# Patient Record
Sex: FEMALE | Race: WHITE | NOT HISPANIC OR LATINO | Employment: FULL TIME | URBAN - METROPOLITAN AREA
[De-identification: names, ages, dates, MRNs, and addresses within clinical notes are randomized per-mention and may not be internally consistent; named-entity substitution may affect disease eponyms.]

---

## 2018-03-09 PROBLEM — N64.4 BREAST PAIN, RIGHT: Status: ACTIVE | Noted: 2018-03-09

## 2023-03-13 ENCOUNTER — TELEPHONE (OUTPATIENT)
Dept: OBSTETRICS AND GYNECOLOGY | Facility: CLINIC | Age: 28
End: 2023-03-13
Payer: COMMERCIAL

## 2023-03-13 NOTE — TELEPHONE ENCOUNTER
----- Message from Dee Dee Dowling sent at 3/13/2023  8:27 AM CDT -----  Contact: Self  Type:  Sooner Appointment Request    Caller is requesting a sooner appointment.  Caller declined first available appointment listed below.  Caller will not accept being placed on the waitlist and is requesting a message be sent to doctor.    Name of Caller:  Patient  When is the first available appointment?  N/A  Symptoms:  Pregnancy Confirmation, having cramping, heart palpitations and heart racing  Best Call Back Number:  [phone]  Additional Information:  Pt leaving to go out of town Friday and wants to get in this week. Please call pt back to advise. Thank You

## 2023-03-13 NOTE — TELEPHONE ENCOUNTER
Spoke with patient. LMP 2-.  Informed first visit between 8-10 weeks of pregnant. Appt scheduled for 4-.   Informed patient no amusement park rides while pregnant.   Also recommend to reach out to PCP regarding Palpitations and SOB, patient verb understanding.

## 2023-07-19 PROBLEM — O26.892 VAGINAL DISCHARGE DURING PREGNANCY IN SECOND TRIMESTER: Status: ACTIVE | Noted: 2023-07-19

## 2023-07-19 PROBLEM — Z3A.22 22 WEEKS GESTATION OF PREGNANCY: Status: ACTIVE | Noted: 2023-07-19

## 2023-07-19 PROBLEM — Z03.71 SUSPECTED RUPTURE OF MEMBRANES NOT FOUND FOR NORMAL FIRST PREGNANCY: Status: ACTIVE | Noted: 2023-07-19

## 2023-07-19 PROBLEM — R10.2 PELVIC PAIN IN PREGNANCY, ANTEPARTUM, SECOND TRIMESTER: Status: ACTIVE | Noted: 2023-07-19

## 2023-07-19 PROBLEM — N89.8 VAGINAL DISCHARGE DURING PREGNANCY IN SECOND TRIMESTER: Status: ACTIVE | Noted: 2023-07-19

## 2023-08-17 ENCOUNTER — TELEPHONE (OUTPATIENT)
Dept: OBSTETRICS AND GYNECOLOGY | Facility: CLINIC | Age: 28
End: 2023-08-17
Payer: COMMERCIAL

## 2023-08-17 NOTE — TELEPHONE ENCOUNTER
Called pt regarding appt on 8/29.   Informed pt that we would need her to fax over her medical records from her previous OB before we can see.  The doctor has to approve her transfer by her being so far along  Pt verb understanding

## 2023-08-17 NOTE — TELEPHONE ENCOUNTER
----- Message from Masha Sagastume sent at 8/17/2023  4:24 PM CDT -----  Regarding: call back  Type:  Patient Returning Call    Who Called:Pt    Who Left Message for Patient:Nurse    Does the patient know what this is regarding?:n/a    Would the patient rather a call back or a response via [facility]? Callback    Best Call Back Number:[phone]    Additional Information: Please return call. Please advise  ------------- thank you

## 2023-10-09 PROBLEM — Z3A.34 34 WEEKS GESTATION OF PREGNANCY: Status: ACTIVE | Noted: 2023-10-09

## 2023-10-09 PROBLEM — O47.9 UTERINE CONTRACTIONS AT GREATER THAN 20 WEEKS OF GESTATION: Status: ACTIVE | Noted: 2023-10-09

## 2023-10-09 PROBLEM — O09.293 HISTORY OF PRE-ECLAMPSIA IN PRIOR PREGNANCY, CURRENTLY PREGNANT IN THIRD TRIMESTER: Status: ACTIVE | Noted: 2023-10-09

## 2023-10-23 PROBLEM — Z03.71 SUSPECTED RUPTURE OF MEMBRANES NOT FOUND FOR NORMAL FIRST PREGNANCY: Status: RESOLVED | Noted: 2023-07-19 | Resolved: 2023-10-23

## 2023-10-31 PROBLEM — O24.419 GESTATIONAL DIABETES MELLITUS: Status: ACTIVE | Noted: 2023-10-31

## 2023-10-31 PROBLEM — O14.90 PREECLAMPSIA: Status: ACTIVE | Noted: 2023-10-31
